# Patient Record
(demographics unavailable — no encounter records)

---

## 2025-02-24 NOTE — PHYSICAL EXAM
[Erythematous Oropharynx] : erythematous oropharynx [Supple] : supple [NL] : warm, clear [Hepatosplenomegaly] : no hepatosplenomegaly [de-identified] : red OP [de-identified] : t nodes not TTP

## 2025-02-24 NOTE — DISCUSSION/SUMMARY
[FreeTextEntry1] : sore throatt 2-3 days, no fever PE afebrile, NAD TMs nl Red OP T nodes not TTP no hsm RST neg suggest gargke w salt water, humidifier, fluids